# Patient Record
Sex: MALE | Race: WHITE
[De-identification: names, ages, dates, MRNs, and addresses within clinical notes are randomized per-mention and may not be internally consistent; named-entity substitution may affect disease eponyms.]

---

## 2017-01-25 ENCOUNTER — HOSPITAL ENCOUNTER (EMERGENCY)
Dept: HOSPITAL 80 - CED | Age: 62
Discharge: HOME | End: 2017-01-25
Payer: COMMERCIAL

## 2017-01-25 VITALS
DIASTOLIC BLOOD PRESSURE: 62 MMHG | OXYGEN SATURATION: 97 % | TEMPERATURE: 98 F | RESPIRATION RATE: 18 BRPM | HEART RATE: 78 BPM | SYSTOLIC BLOOD PRESSURE: 115 MMHG

## 2017-01-25 DIAGNOSIS — H93.90: ICD-10-CM

## 2017-01-25 DIAGNOSIS — Z87.891: ICD-10-CM

## 2017-01-25 DIAGNOSIS — J06.9: Primary | ICD-10-CM

## 2017-01-25 NOTE — UCPHY
H & P


Time Seen by Provider: 01/25/17 11:04


Patient Type: Established


HPI/ROS: 


This patient presents with a chief complaint of nasal congestion, sinus 

congestion and ear pressure which began 3 or 4 days ago.  He has had some 

postnasal drip but no sore throat, cough or fever.  He has a headache.





REVIEW OF SYSTEMS:


Constitutional:  No fever, positive for fatigue


Eyes:  No complaints


ENT:  Nasal congestion with some postnasal drip, no actual sore throat, 

bilateral ear pressure


Respiratory:  No significant cough, no shortness of breath


Cardiac:  No chest pain


Gastrointestinal:  Not addressed


Genitourinary:  Not addressed


Musculoskeletal:  No myalgias


Skin:  No rash


Neurological:  Headache


Smoking Status: Former smoker


Physical Exam: 


GENERAL:  Well-appearing, well-nourished and in no acute distress.


HEAD:  Atraumatic, normocephalic.


EYES: sclera anicteric, conjunctiva are normal.


ENT:  TMs normal, nares patent, oropharynx clear without exudates.  Moist 

mucous membranes.  There is no tenderness to percussion over the major facial 

sinuses.


NECK:  Normal range of motion, supple without lymphadenopathy or JVD.


LUNGS:  Breath sounds clear to auscultation bilaterally and equal.  No wheezes 

rales or rhonchi.


HEART:  Regular rate and rhythm


EXTREMITIES:  Normal range of motion, 


NEUROLOGICAL:  Cranial nerves II through XII grossly intact.  Normal speech, 

normal gait.


PSYCH:  Normal mood, normal affect.


SKIN:  Warm, dry, normal turgor, no visible rashes or lesions.


Constitutional: 





 Initial Vital Signs











Temperature (C)  36.6 C   01/25/17 10:47


 


Heart Rate  78   01/25/17 10:47


 


Respiratory Rate  18   01/25/17 10:47


 


Blood Pressure  115/62   01/25/17 10:47


 


O2 Sat (%)  97   01/25/17 10:47








 











O2 Delivery Mode               Room Air














Allergies/Adverse Reactions: 


 





No Known Allergies Allergy (Verified 01/25/17 10:49)


 








Home Medications: 














 Medication  Instructions  Recorded


 


NK [No Known Home Meds]  01/25/17














Medical Decision Making


Differential Diagnosis: 


I believe that this patient has a viral illness as there is no evidence for 

bacterial 1. I do not feel antibiotics are indicated nor does feel that further 

investigation is indicated at this time.





Departure





- Departure


Disposition: Home, Routine, Self-Care


Clinical Impression: 


 Upper respiratory infection


Condition: Good


Instructions:  Upper Respiratory Infection (ED)


Additional Instructions: 


If your symptoms have not resolved in a week you should be re-evaluated.  In 

the meantime he may develop a cough which would not be unusual.  Cause for 

concern would be temperature greater than 101 degrees increasing  symptoms 

shortness of breath or chest pain.


Use a nasal decongestant spray such as Afrin.





Adult Pain & Fever Control:


We recommend Acetaminophen (Tylenol) and Ibuprofen (Motrin, Advil) for pain and 

fever control.  When fever is high or pain severe, both drugs can be used at 

the same time, but at different intervals.  Please note the time differences.


Your dose is:  Acetaminophen [650]mg every 4 to 6 hours ibuprofen [600]mg every 

[6] hours with food OR naproxen Sodium (Aleve) [440]mg every 12 hours.





Note:  do not take Acetaminophen with Hydrocodone (Vicodin, Lortab) or 

Oxycodone (Percocet).  These medications also contain Acetaminophen.





No more than 3000 mg of Acetaminophen should be taken in 24 hours (for an adult)

.





The maximal dose of ibuprofen that it is safe in a 24-hour period is 2400 mg.  

You may take 400 mg every 4 hours, 600 mg every 6 hours or 800 mg every 8 hours 

safely.


Referrals: 


NONE *PRIMARY CARE P,. [Primary Care Provider] - As per Instructions


Chad Adams DO [Doctor of Osteopathy] - As per Instructions





- PQRS


PQRS Measurement: 


Not applicable

## 2018-09-20 ENCOUNTER — HOSPITAL ENCOUNTER (OUTPATIENT)
Dept: HOSPITAL 80 - CIMAGING | Age: 63
End: 2018-09-20
Attending: FAMILY MEDICINE
Payer: COMMERCIAL

## 2018-09-20 DIAGNOSIS — K80.20: Primary | ICD-10-CM

## 2018-09-20 DIAGNOSIS — K76.89: ICD-10-CM
